# Patient Record
Sex: MALE | Race: BLACK OR AFRICAN AMERICAN | Employment: FULL TIME | URBAN - METROPOLITAN AREA
[De-identification: names, ages, dates, MRNs, and addresses within clinical notes are randomized per-mention and may not be internally consistent; named-entity substitution may affect disease eponyms.]

---

## 2017-02-27 PROBLEM — E11.9 DIABETES MELLITUS TYPE 2 WITHOUT RETINOPATHY (HCC): Status: ACTIVE | Noted: 2017-02-27

## 2017-02-27 PROCEDURE — 36415 COLL VENOUS BLD VENIPUNCTURE: CPT | Performed by: FAMILY MEDICINE

## 2017-02-27 PROCEDURE — 82043 UR ALBUMIN QUANTITATIVE: CPT | Performed by: FAMILY MEDICINE

## 2017-02-27 PROCEDURE — 82570 ASSAY OF URINE CREATININE: CPT | Performed by: FAMILY MEDICINE

## 2017-05-16 PROBLEM — I42.9 CARDIOMYOPATHY (HCC): Status: ACTIVE | Noted: 2017-05-16

## 2017-08-03 PROBLEM — E66.01 MORBID OBESITY (HCC): Status: ACTIVE | Noted: 2017-08-03

## 2018-01-30 PROCEDURE — 82043 UR ALBUMIN QUANTITATIVE: CPT | Performed by: FAMILY MEDICINE

## 2018-01-30 PROCEDURE — 82570 ASSAY OF URINE CREATININE: CPT | Performed by: FAMILY MEDICINE

## 2018-02-13 PROBLEM — F98.8 ATTENTION DEFICIT DISORDER, UNSPECIFIED HYPERACTIVITY PRESENCE: Status: ACTIVE | Noted: 2018-02-13

## 2018-11-21 PROBLEM — E55.9 VITAMIN D DEFICIENCY: Status: ACTIVE | Noted: 2018-11-21

## 2018-12-03 ENCOUNTER — OFFICE VISIT (OUTPATIENT)
Dept: FAMILY MEDICINE CLINIC | Facility: CLINIC | Age: 53
End: 2018-12-03
Payer: COMMERCIAL

## 2018-12-03 ENCOUNTER — HOSPITAL ENCOUNTER (OUTPATIENT)
Dept: GENERAL RADIOLOGY | Age: 53
Discharge: HOME | End: 2018-12-03
Attending: NURSE PRACTITIONER
Payer: COMMERCIAL

## 2018-12-03 VITALS
OXYGEN SATURATION: 98 % | SYSTOLIC BLOOD PRESSURE: 152 MMHG | HEART RATE: 116 BPM | TEMPERATURE: 98 F | RESPIRATION RATE: 20 BRPM | BODY MASS INDEX: 46.3 KG/M2 | DIASTOLIC BLOOD PRESSURE: 90 MMHG | HEIGHT: 67 IN | WEIGHT: 295 LBS

## 2018-12-03 DIAGNOSIS — S90.32XA CONTUSION OF LEFT FOOT, INITIAL ENCOUNTER: ICD-10-CM

## 2018-12-03 DIAGNOSIS — R52 PAIN: ICD-10-CM

## 2018-12-03 DIAGNOSIS — S99.922A INJURY OF LEFT FOOT, INITIAL ENCOUNTER: Primary | ICD-10-CM

## 2018-12-03 DIAGNOSIS — S99.922A INJURY OF LEFT FOOT, INITIAL ENCOUNTER: ICD-10-CM

## 2018-12-03 PROCEDURE — 99203 OFFICE O/P NEW LOW 30 MIN: CPT | Performed by: NURSE PRACTITIONER

## 2018-12-03 PROCEDURE — 73630 X-RAY EXAM OF FOOT: CPT | Performed by: NURSE PRACTITIONER

## 2018-12-03 NOTE — PATIENT INSTRUCTIONS
Treatment for Bone Bruise (Bone Contusion)  A bone bruise is an injury to a bone that is less severe than a bone fracture. Bone bruises are fairly common. They can happen to people of all ages. Any type of bone in your body can get a bone bruise.  Other i © 9351-5440 [employer] [address]. [address] [facility] [place], [place], [address] [place]. All rights reserved. This information is not intended as a substitute for professional medical care. Always follow your healthcare professional's instructions.         Foot [employer] © 1624-6798 [employer] [address]. [address] [facility], [facility] [place]. All rights reserved. This information is not intended as a substitute for professional medical care. Always follow your healthcare professional's instructions.

## 2018-12-03 NOTE — PROGRESS NOTES
Krissy Carvalho is a 48year old male. HPI:   Patient's presenting with an uninjury to: left top of foot for lat 5 days. Does report known injury but reports he does sleepwalk and noted pain in the morning. Swelling did go down since onset.   Pt c/o pain and GLIPIZIDE ER 10 MG Oral Tablet 24 Hr TAKE 1 TABLET DAILY Disp: 90 tablet Rfl: 0   BUPROPION HCL ER, XL, 300 MG Oral Tablet 24 Hr TAKE 1 TABLET(300 MG) BY MOUTH DAILY Disp: 30 tablet Rfl: 5   aspirin (ASPIRIN EC LOW DOSE) 81 MG Oral Tab EC Take 1 tablet (81 /90   Pulse 116   Temp 98.1 °F (36.7 °C) (Oral)   Resp 20   Ht 67\"   Wt 295 lb   SpO2 98%   BMI 46.20 kg/m²   GENERAL: well developed, well nourished,in no apparent distress  SKIN: no rashes,no suspicious lesions, skin's intact.    HEENT: atraumatic, - XR FOOT, COMPLETE (MIN 3 VIEWS), LEFT (CPT=73630); Future    Plan: Rest, ice, elevation, NSAID. Follow-up with PCP for worsening symptoms or no improvement  Medication use, dose, and possible side effects discussed.     Meds & Refills for this Visit:  ROBBY Most bone bruises heal without any problems. If your bone bruise is very large, your body may have trouble getting blood flow back to the area. This can cause avascular necrosis of the bone.  This leads to death of that part of the bone.     When to call th Follow up with your healthcare provider or our staff as advised. Call if you are not improving within 1 to 2 weeks.   When to seek medical advice   Call your healthcare provider right away if you have any of the following:  · Increased pain or swelling  · F

## 2019-03-18 PROBLEM — T58.91XA CARBON MONOXIDE POISONING: Status: ACTIVE | Noted: 2019-03-18

## 2019-03-18 PROBLEM — A40.9 STREPTOCOCCAL SEPSIS (HCC): Status: ACTIVE | Noted: 2019-03-18

## 2019-03-18 PROBLEM — L65.9 HAIR LOSS: Status: ACTIVE | Noted: 2019-03-18

## 2019-03-19 PROCEDURE — 81003 URINALYSIS AUTO W/O SCOPE: CPT | Performed by: FAMILY MEDICINE

## 2019-04-05 PROBLEM — A40.9 STREPTOCOCCAL SEPSIS (HCC): Status: RESOLVED | Noted: 2019-03-18 | Resolved: 2019-04-05

## 2019-04-05 PROBLEM — R79.89 ABNORMAL LFTS: Status: ACTIVE | Noted: 2019-04-05

## 2019-11-18 PROBLEM — R53.83 OTHER FATIGUE: Status: ACTIVE | Noted: 2019-11-18

## 2019-11-18 PROBLEM — E53.8 VITAMIN B12 DEFICIENCY: Status: ACTIVE | Noted: 2019-11-18

## 2019-11-18 PROBLEM — T58.91XA CARBON MONOXIDE POISONING: Status: RESOLVED | Noted: 2019-03-18 | Resolved: 2019-11-18

## 2020-11-16 PROBLEM — R73.09 ABNORMAL GLUCOSE: Status: ACTIVE | Noted: 2020-11-16

## 2020-11-16 PROBLEM — G47.33 OSA (OBSTRUCTIVE SLEEP APNEA): Status: ACTIVE | Noted: 2020-11-16

## 2020-11-16 PROBLEM — K59.09 OTHER CONSTIPATION: Status: ACTIVE | Noted: 2020-11-16

## 2021-06-29 PROBLEM — R17 ELEVATED BILIRUBIN: Status: ACTIVE | Noted: 2021-06-29

## 2021-08-04 PROBLEM — L03.113 CELLULITIS OF RIGHT UPPER EXTREMITY: Status: ACTIVE | Noted: 2021-08-04

## 2021-08-04 PROBLEM — L02.413 CUTANEOUS ABSCESS OF RIGHT UPPER EXTREMITY: Status: ACTIVE | Noted: 2021-08-04

## 2021-08-09 PROBLEM — A49.02 MRSA INFECTION: Status: ACTIVE | Noted: 2021-08-09

## 2021-08-09 PROBLEM — L73.2 SUPPURATIVE HIDRADENITIS: Status: ACTIVE | Noted: 2021-08-09

## 2021-12-06 PROBLEM — L03.113 CELLULITIS OF RIGHT UPPER EXTREMITY: Status: RESOLVED | Noted: 2021-08-04 | Resolved: 2021-12-06

## 2021-12-06 PROBLEM — L02.413 CUTANEOUS ABSCESS OF RIGHT UPPER EXTREMITY: Status: RESOLVED | Noted: 2021-08-04 | Resolved: 2021-12-06

## 2022-01-05 PROBLEM — M10.9 GOUT: Status: ACTIVE | Noted: 2022-01-05

## 2022-01-05 PROBLEM — F52.8 PSYCHOSEXUAL DYSFUNCTION WITH INHIBITED SEXUAL EXCITEMENT: Status: ACTIVE | Noted: 2022-01-05

## 2023-03-15 ENCOUNTER — OFFICE VISIT (OUTPATIENT)
Dept: FAMILY MEDICINE CLINIC | Facility: CLINIC | Age: 58
End: 2023-03-15
Payer: COMMERCIAL

## 2023-03-15 VITALS
WEIGHT: 246 LBS | DIASTOLIC BLOOD PRESSURE: 84 MMHG | TEMPERATURE: 98 F | HEIGHT: 66 IN | BODY MASS INDEX: 39.53 KG/M2 | RESPIRATION RATE: 18 BRPM | HEART RATE: 98 BPM | SYSTOLIC BLOOD PRESSURE: 142 MMHG | OXYGEN SATURATION: 98 %

## 2023-03-15 DIAGNOSIS — H66.002 NON-RECURRENT ACUTE SUPPURATIVE OTITIS MEDIA OF LEFT EAR WITHOUT SPONTANEOUS RUPTURE OF TYMPANIC MEMBRANE: Primary | ICD-10-CM

## 2023-03-15 DIAGNOSIS — J01.00 ACUTE NON-RECURRENT MAXILLARY SINUSITIS: ICD-10-CM

## 2023-03-15 DIAGNOSIS — H61.22 HEARING LOSS OF LEFT EAR DUE TO CERUMEN IMPACTION: ICD-10-CM

## 2023-03-15 PROCEDURE — 3008F BODY MASS INDEX DOCD: CPT | Performed by: FAMILY MEDICINE

## 2023-03-15 PROCEDURE — 3077F SYST BP >= 140 MM HG: CPT | Performed by: FAMILY MEDICINE

## 2023-03-15 PROCEDURE — 99203 OFFICE O/P NEW LOW 30 MIN: CPT | Performed by: FAMILY MEDICINE

## 2023-03-15 PROCEDURE — 3079F DIAST BP 80-89 MM HG: CPT | Performed by: FAMILY MEDICINE

## 2023-03-15 RX ORDER — BENZONATATE 200 MG/1
200 CAPSULE ORAL 3 TIMES DAILY PRN
Qty: 30 CAPSULE | Refills: 0 | Status: SHIPPED | OUTPATIENT
Start: 2023-03-15

## 2023-03-15 RX ORDER — AMOXICILLIN AND CLAVULANATE POTASSIUM 875; 125 MG/1; MG/1
1 TABLET, FILM COATED ORAL 2 TIMES DAILY
Qty: 20 TABLET | Refills: 0 | Status: SHIPPED | OUTPATIENT
Start: 2023-03-15 | End: 2023-03-25

## 2024-01-20 ENCOUNTER — OFFICE VISIT (OUTPATIENT)
Dept: FAMILY MEDICINE CLINIC | Facility: CLINIC | Age: 59
End: 2024-01-20
Payer: COMMERCIAL

## 2024-01-20 VITALS
DIASTOLIC BLOOD PRESSURE: 78 MMHG | HEART RATE: 92 BPM | HEIGHT: 66 IN | WEIGHT: 243 LBS | OXYGEN SATURATION: 98 % | BODY MASS INDEX: 39.05 KG/M2 | RESPIRATION RATE: 20 BRPM | SYSTOLIC BLOOD PRESSURE: 160 MMHG | TEMPERATURE: 98 F

## 2024-01-20 DIAGNOSIS — J01.00 ACUTE NON-RECURRENT MAXILLARY SINUSITIS: Primary | ICD-10-CM

## 2024-01-20 DIAGNOSIS — R03.0 ELEVATED BLOOD PRESSURE READING: ICD-10-CM

## 2024-01-20 PROCEDURE — 99213 OFFICE O/P EST LOW 20 MIN: CPT | Performed by: NURSE PRACTITIONER

## 2024-01-20 PROCEDURE — 3008F BODY MASS INDEX DOCD: CPT | Performed by: NURSE PRACTITIONER

## 2024-01-20 PROCEDURE — 3078F DIAST BP <80 MM HG: CPT | Performed by: NURSE PRACTITIONER

## 2024-01-20 PROCEDURE — 3077F SYST BP >= 140 MM HG: CPT | Performed by: NURSE PRACTITIONER

## 2024-01-20 RX ORDER — AMOXICILLIN AND CLAVULANATE POTASSIUM 875; 125 MG/1; MG/1
1 TABLET, FILM COATED ORAL 2 TIMES DAILY
Qty: 14 TABLET | Refills: 0 | Status: SHIPPED | OUTPATIENT
Start: 2024-01-20 | End: 2024-01-27

## 2024-01-20 NOTE — PROGRESS NOTES
CHIEF COMPLAINT:     Chief Complaint   Patient presents with    Sinus Problem     Congestion, x1 week        HPI:   Jesus Pinzon is a 58 year old male who presents for sinus congestion for  over 1 week.  Symptoms have been worsening since onset. Sinus congestion/pain is described as a pressure and is located mainly congestion.  Reports nasal discharge. Has treated symptoms with afrin.  Denies fever, chills, dental pain, tinnitus, N/V/D.        Current Outpatient Medications   Medication Sig Dispense Refill    amoxicillin clavulanate 875-125 MG Oral Tab Take 1 tablet by mouth 2 (two) times daily for 7 days. 14 tablet 0    metFORMIN 500 MG Oral Tab       losartan-hydroCHLOROthiazide 100-25 MG Oral Tab Take 1 tablet by mouth daily. 90 tablet 1    AMLODIPINE 10 MG Oral Tab TAKE 1 TABLET(10 MG) BY MOUTH DAILY 90 tablet 1    carvedilol 12.5 MG Oral Tab Take 1 tablet (12.5 mg total) by mouth 2 (two) times daily with meals. 180 tablet 1    ARIPiprazole 5 MG Oral Tab Take 1 tablet (5 mg total) by mouth daily.      Glucose Blood (ONETOUCH VERIO) In Vitro Strip TEST BLOOD SUGAR FOUR TIMES DAILY 100 strip 3    buPROPion HCl ER, XL, 150 MG Oral Tablet 24 Hr Take 1 tablet (150 mg total) by mouth daily.      ADDERALL XR 30 MG Oral Capsule SR 24 Hr Take 1 capsule (30 mg total) by mouth daily.      Sildenafil Citrate 100 MG Oral Tab Take 1 tablet (100 mg total) by mouth daily as needed for Erectile Dysfunction. To take one hour prior to anticipated need, avoiding heavy meal or alcohol. 30 tablet 0    cloNIDine HCl 0.1 MG Oral Tab Take 1 tablet (0.1 mg total) by mouth 2 (two) times daily. 180 tablet 1    BD PEN NEEDLE MINI U/F 31G X 5 MM Does not apply Misc USE TO INJECT ONCE DAILY AS DIRECTED 100 each 10    BUPROPION HCL ER, XL, 300 MG Oral Tablet 24 Hr TAKE 1 TABLET(300 MG) BY MOUTH DAILY 30 tablet 5    ONETOUCH LANCETS Does not apply Misc TO TEST BLOOD SUGAR ONCE DAILY 200 each 5    benzonatate 200 MG Oral Cap Take 1 capsule  (200 mg total) by mouth 3 (three) times daily as needed. (Patient not taking: Reported on 1/20/2024) 30 capsule 0    amphetamine-dextroamphetamine 30 MG Oral Tab Take 1 tablet by mouth daily. (Patient not taking: Reported on 3/15/2023)      pravastatin 20 MG Oral Tab Take 1 tablet (20 mg total) by mouth daily. (Patient not taking: Reported on 3/15/2023) 90 tablet 1    QUEtiapine 200 MG Oral Tab  (Patient not taking: Reported on 3/15/2023)      lidocaine 5 % External Ointment Apply topically BID (Patient not taking: Reported on 3/15/2023) 50 g 0    Vitamin B-12 1000 MCG Oral Tab Take 2,000 mcg by mouth daily. (Patient not taking: Reported on 3/15/2023)        Past Medical History:   Diagnosis Date    Anxiety     Carbon monoxide poisoning 3/18/2019    Cellulitis of right upper extremity 8/4/2021    Clostridium difficile infection 01/2015 & 03/2015    Cutaneous abscess of right upper extremity 8/4/2021    Depression     Psychiatric disorder     anxiety    Sleep apnea     Type II or unspecified type diabetes mellitus without mention of complication, not stated as uncontrolled     Unspecified essential hypertension     Visual impairment     vision adequate with glassses      Past Surgical History:   Procedure Laterality Date    INCISION AND DRAINAGE Right 2008    hydronitis with mrsa under right arm     TONSILLECTOMY        Family History   Problem Relation Age of Onset    High Blood Pressure Mother     Anxiety Mother     Heart Disorder Mother     Hypertension Mother     Lipids Mother     Psychiatric Mother     Depression Mother     Other (Other) Mother     Hypertension Sister     Anxiety Sister     Depression Sister       Social History     Socioeconomic History    Marital status:    Tobacco Use    Smoking status: Some Days     Packs/day: 1     Types: Cigarettes    Smokeless tobacco: Never   Vaping Use    Vaping Use: Every day   Substance and Sexual Activity    Alcohol use: Yes     Alcohol/week: 3.0 standard  drinks of alcohol     Types: 1 Glasses of wine, 1 Cans of beer, 1 Shots of liquor per week     Comment: reports he has been to [facility] & has alcoholism, last drink 3 or 4 days ago    Drug use: No         REVIEW OF SYSTEMS:   GENERAL: feels well otherwise, no unplanned weight change,  good appetite  SKIN: no rashes or abnormal skin lesions  HEENT: See HPI.    LUNGS: denies shortness of breath or wheezing, See HPI  CARDIOVASCULAR: denies chest pain or palpitations   GI: denies N/V/C or abdominal pain  NEURO:No numbness or tingling in face.    EXAM:   /78   Pulse 92   Temp 97.6 °F (36.4 °C) (Temporal)   Resp 20   Ht 5' 6\" (1.676 m)   Wt 243 lb (110.2 kg)   SpO2 98%   BMI 39.22 kg/m²   GENERAL: well developed, well nourished,in no apparent distress  SKIN: no rashes,no suspicious lesions  HEAD: atraumatic, normocephalic,  no tenderness on palpation of sinuses  EYES: conjunctiva clear, EOM intact  EARS: TM's clear gray, no bulging, no retraction, + fluid, bony landmarks intact  NOSE: nostrils patent, yellow nasal mucous, nasal mucosa reddened and swollen  THROAT: oral mucosa pink, moist. No visible dental caries. Posterior pharynx is mild erythematous. no exudates.  NECK: supple, non-tender  LUNGS: clear to auscultation bilaterally, no wheezes or rhonchi, no diminished breath sounds. Breathing is non labored.  CARDIO: RRR without murmur  EXTREMITIES: no cyanosis, clubbing or edema  LYMPH:  bilateral anterior cervical lymphadenopathy.   NEURO: No focal deficits       ASSESSMENT AND PLAN:   ASSESSMENT:  Jesus Pinzon is a 58 year old male who presents with    ASSESSMENT:   Encounter Diagnoses   Name Primary?    Acute non-recurrent maxillary sinusitis Yes    Elevated blood pressure reading      Stop afrin. Start flonase.   Mucinex prn.   History of sinus infection. Printed script if worsening in 3-5 days.   Monitor BP at home. Pt didn't take BP medication yet today.     PLAN: Meds and instructions as below.   Comfort care instructions as listed in Patient Instructions.  To f/u with PCP if no improvement in 3-5 days or sooner if sx worsen.    Meds & Refills for this Visit:  Requested Prescriptions     Signed Prescriptions Disp Refills    amoxicillin clavulanate 875-125 MG Oral Tab 14 tablet 0     Sig: Take 1 tablet by mouth 2 (two) times daily for 7 days.       Risks, benefits, side effects of medication addressed and explained.    See pt handout    The patient indicates understanding of these issues and agrees to the plan.

## (undated) NOTE — LETTER
Date: 12/3/2018    Patient Name: Sukumar Hargrove          To Whom it may concern: The above patient was seen at the [facility] for treatment of a medical condition. This patient should be excused from attending work/school on 12/3/18.